# Patient Record
Sex: FEMALE | NOT HISPANIC OR LATINO | URBAN - METROPOLITAN AREA
[De-identification: names, ages, dates, MRNs, and addresses within clinical notes are randomized per-mention and may not be internally consistent; named-entity substitution may affect disease eponyms.]

---

## 2017-03-24 ENCOUNTER — IMPORTED ENCOUNTER (OUTPATIENT)
Dept: URBAN - METROPOLITAN AREA CLINIC 1 | Facility: CLINIC | Age: 52
End: 2017-03-24

## 2017-03-24 PROBLEM — H40.1133: Noted: 2017-03-24

## 2017-03-24 PROBLEM — H25.813: Noted: 2017-03-24

## 2017-03-24 PROCEDURE — 92014 COMPRE OPH EXAM EST PT 1/>: CPT

## 2017-03-24 PROCEDURE — 92133 CPTRZD OPH DX IMG PST SGM ON: CPT

## 2017-03-24 NOTE — PATIENT DISCUSSION
1.  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 Slight progression by OCT OU w/ prior non compliance. Patient to continue with Combigan OU TID and Lumigan OU QHS. ***Consider adding a drop verse ALT w/ any future progression or increased IOP OU.*** Pt advised to be compliant with gtts. Discussed likelihood of blindness in future pt understood. 2.  Cataract OU: Observe for now without intervention. The patient was advised to contact us if any change or worsening of vision3. Return for an appointment for a 10/HVF in 4 months with Dr. Ioana Montenegro.

## 2017-03-30 ENCOUNTER — IMPORTED ENCOUNTER (OUTPATIENT)
Dept: URBAN - METROPOLITAN AREA CLINIC 1 | Facility: CLINIC | Age: 52
End: 2017-03-30

## 2017-03-30 PROBLEM — H52.13: Noted: 2017-03-30

## 2017-03-30 PROBLEM — H52.4: Noted: 2017-03-30

## 2017-03-30 PROCEDURE — S0621 ROUTINE OPHTHALMOLOGICAL EXA: HCPCS

## 2017-03-30 NOTE — PATIENT DISCUSSION
1. Myopia: Rx was given for correction if indicated and requested. 2. Presbyopia 3. Open Angle Glaucoma- End stage OU (0.95 OU) - improved IOP OU today 4. Cataract OU - Observe 5. Helyn Speaker for an appointment in 1 year 36 with Dr. Ted Johansen.

## 2017-08-25 ENCOUNTER — IMPORTED ENCOUNTER (OUTPATIENT)
Dept: URBAN - METROPOLITAN AREA CLINIC 1 | Facility: CLINIC | Age: 52
End: 2017-08-25

## 2017-08-25 PROBLEM — H25.813: Noted: 2017-08-25

## 2017-08-25 PROBLEM — H16.143: Noted: 2017-08-25

## 2017-08-25 PROBLEM — H04.123: Noted: 2017-08-25

## 2017-08-25 PROBLEM — H40.1133: Noted: 2017-08-25

## 2017-08-25 PROCEDURE — 92012 INTRM OPH EXAM EST PATIENT: CPT

## 2017-08-25 PROCEDURE — 92083 EXTENDED VISUAL FIELD XM: CPT

## 2017-08-25 PROCEDURE — 83861 MICROFLUID ANALY TEARS: CPT

## 2017-08-25 NOTE — PATIENT DISCUSSION
1.  Open Angle Glaucoma End stage OU (0.95 OU)- Stable IOP OU. HVF defects show no progression OU. Patient to continue with Combigan OU TID and Latanoprost OU QHS. Patient advised to be compliant with gtts. Condition was discussed with patient and patient understands. Will continue to monitor patient for any progression in condition. Patient was advised to call us with any problems questions or concerns. 2.  JEFF w/ PEK OU- Moderate per tear lab OU. Increased symptoms. The use/continuation of artificial tears were recommended. Placed small Silicone Plugs RLL and LLL:  Risks benefits and alternatives to placing plug was discussed with patient. Patient understands and would like to proceed. Consent was signed. Post op instructions were discussed/given to patient and patient was advised to call our office if any problems arose. 3.  Cataract OU: Observe for now without intervention. The patient was advised to contact us if any change or worsening of vision4. Return for an appointment for a 30/OCT/glare in Feb. with Dr. Christin King.

## 2018-02-22 ENCOUNTER — IMPORTED ENCOUNTER (OUTPATIENT)
Dept: URBAN - METROPOLITAN AREA CLINIC 1 | Facility: CLINIC | Age: 53
End: 2018-02-22

## 2018-02-22 PROBLEM — H40.1133: Noted: 2018-02-22

## 2018-02-22 PROBLEM — H25.813: Noted: 2018-02-22

## 2018-02-22 PROBLEM — H04.123: Noted: 2018-02-22

## 2018-02-22 PROBLEM — H16.143: Noted: 2018-02-22

## 2018-02-22 PROCEDURE — 92133 CPTRZD OPH DX IMG PST SGM ON: CPT

## 2018-02-22 PROCEDURE — 92014 COMPRE OPH EXAM EST PT 1/>: CPT

## 2018-02-22 NOTE — PATIENT DISCUSSION
1.  Open Angle Glaucoma End stage OU (0.95 OU)- Stable IOP OU. OCT shows no progression OU. Patient to continue with Combigan OU TID and Latanoprost OU QHS. Add Azopt TID with any future progression. Patient advised to be compliant with gtts. 2.  JEFF w/ PEK OU- (H/o Plugs LLs OU) Cont ATs BID OU Routinely. 3.  Cataract OU: Observe for now without intervention. The patient was advised to contact us if any change or worsening of visionReturn for an appointment in August 10 VF 24-2 OU with Dr. Cristofer De Guzman.

## 2018-04-30 ENCOUNTER — IMPORTED ENCOUNTER (OUTPATIENT)
Dept: URBAN - METROPOLITAN AREA CLINIC 1 | Facility: CLINIC | Age: 53
End: 2018-04-30

## 2018-04-30 PROBLEM — H52.13: Noted: 2018-04-30

## 2018-04-30 PROBLEM — H52.4: Noted: 2018-04-30

## 2018-04-30 PROCEDURE — S0621 ROUTINE OPHTHALMOLOGICAL EXA: HCPCS

## 2018-04-30 NOTE — PATIENT DISCUSSION
1. Myopia: Rx was given for correction if indicated and requested. 2. Presbyopia3. Open Angle Glaucoma End stage OU (0.95 OU)- Stable IOP OU. Patient to continue with Combigan OU TID and Latanoprost OU QHS. 4.  JEFF w/ PEK OU- (H/o Plugs LLs OU) Cont ATs BID OU Routinely. 5.  Cataract OUReturn for an appointment in 1 year 36 with Dr. Shanda Arias.

## 2018-10-02 ENCOUNTER — IMPORTED ENCOUNTER (OUTPATIENT)
Dept: URBAN - METROPOLITAN AREA CLINIC 1 | Facility: CLINIC | Age: 53
End: 2018-10-02

## 2018-10-02 PROBLEM — H40.1133: Noted: 2018-10-02

## 2018-10-02 PROBLEM — H25.811: Noted: 2018-10-02

## 2018-10-02 PROBLEM — H16.143: Noted: 2018-10-02

## 2018-10-02 PROBLEM — H25.812: Noted: 2018-10-02

## 2018-10-02 PROBLEM — H25.813: Noted: 2018-10-02

## 2018-10-02 PROBLEM — H04.123: Noted: 2018-10-02

## 2018-10-02 PROCEDURE — 92015 DETERMINE REFRACTIVE STATE: CPT

## 2018-10-02 PROCEDURE — 92012 INTRM OPH EXAM EST PATIENT: CPT

## 2018-10-02 PROCEDURE — 92083 EXTENDED VISUAL FIELD XM: CPT

## 2018-10-02 NOTE — PATIENT DISCUSSION
1.  Cataract OS: Visually Significant discussed the risks benefits alternatives and limitations of cataract surgery. The patient stated a full understanding and a desire to proceed with the procedure. The patient will need to return for preop appointment with cataract measurements and to have any additional questions answered and start pre-operative eye drops as directed. *Discussed risks of Cataract sx given Advanced stage of COAG pt understands. Schedule phaco PCL OSOtherwise follow-up in 4-6 weeks for an IOP check on new drop regiment2. Cataract OD: Observe for now without intervention. The patient was advised to contact us if any change or worsening of vision3. Severe Open Angle Glaucoma OS>OD (0.95 OU)- HVF shows progression OU. Stable IOP and C/D OU. Start monotrial o  Rhopressa OD QHS (sample given.) Start monotrial of Azopt OS TID. Patient to switch Lumigan OU QHS. Continue Combigan OU TID. If response poor to drop change and phaco/PCL pt will need to consider Trab. Patient advised to be compliant with gtts. Condition was discussed with patient and patient understands. Will continue to monitor patient for any progression in condition. Patient was advised to call us with any problems questions or concerns. 4.  JEFF w/ PEK OS>OD- Plugs intact LLs OU. The continuation of artificial tears were recommended. 5. Return for an appointment for Ascestuardo H&P OS with Dr. Jahaira Jerry.

## 2018-10-15 ENCOUNTER — IMPORTED ENCOUNTER (OUTPATIENT)
Dept: URBAN - METROPOLITAN AREA CLINIC 1 | Facility: CLINIC | Age: 53
End: 2018-10-15

## 2018-10-15 PROBLEM — H04.123: Noted: 2018-10-15

## 2018-10-15 PROBLEM — H40.1133: Noted: 2018-10-15

## 2018-10-15 PROBLEM — H16.143: Noted: 2018-10-15

## 2018-10-15 PROCEDURE — 99213 OFFICE O/P EST LOW 20 MIN: CPT

## 2018-10-15 NOTE — PATIENT DISCUSSION
1.  End Stage Open Angle Glaucoma OU (CD 0.95 OU) - Good response to new gtt regiment. Will now use Rhopressa QHS OU and Azopt TID OU decrease Combigan BID OU (due to Rhopressa being used OU now) and Lumigan QHS OU. Patient advised to be compliant with gtts. Written rx given for Combigan Rhopressa and Azopt 2. JEFF w/ PEK OU- Plugs intact LLs OU. The use/continuation of artificial tears were recommended. 3.  Cataract OS - RTC as scheduled for Phaco/PCL OS 4. Cataract OD - ObserveReturn for an appointment for Return as scheduled with Dr. Flo Storm.

## 2018-11-05 ENCOUNTER — IMPORTED ENCOUNTER (OUTPATIENT)
Dept: URBAN - METROPOLITAN AREA CLINIC 1 | Facility: CLINIC | Age: 53
End: 2018-11-05

## 2018-11-05 PROBLEM — H25.812: Noted: 2018-11-05

## 2018-11-05 PROCEDURE — 92136 OPHTHALMIC BIOMETRY: CPT

## 2018-11-05 NOTE — PATIENT DISCUSSION
1. Cataract OS:  Visually Significant discussed the risks benefits alternatives and limitations of cataract surgery. The patient stated a full understanding and a desire to proceed with the procedure. Phaco PCL OS (Standard lens standard procedure)  **Myopic Goal** Return for an appointment for Return as scheduled with Dr. Lele Hartley.

## 2019-01-15 ENCOUNTER — IMPORTED ENCOUNTER (OUTPATIENT)
Dept: URBAN - METROPOLITAN AREA CLINIC 1 | Facility: CLINIC | Age: 54
End: 2019-01-15

## 2019-01-15 PROBLEM — H04.123: Noted: 2019-01-15

## 2019-01-15 PROBLEM — H40.1133: Noted: 2019-01-15

## 2019-01-15 PROBLEM — H16.143: Noted: 2019-01-15

## 2019-01-15 PROCEDURE — 99213 OFFICE O/P EST LOW 20 MIN: CPT

## 2019-01-15 NOTE — PATIENT DISCUSSION
1.  End Stage Open Angle Glaucoma OU (CD 0.95 OU) - increased IOP OU. Recently had SLT OD with VEC. Will defer drop  regimen to Dr. Tiffanie Queen. ? should PT consider filtering procedure. She will discuss with Dr. Tiffanie Queen. Rhopressa QHS OU Azopt TID OU Combigan BID OU and Lumigan QHS OU. Patient advised to be compliant with gtts. 2.  JEFF w/ PEK OU - Plugs intact LLs OU. The use/continuation of artificial tears were recommended. 3.  Cataract OS - Visually significant. PT would like to hold on surgery at this time. Explained to patient that cataract surgery is recommended. 4. Cataract OD - ObserveReturn for an appointment in as needed with Dr. Justina Encinas.

## 2020-07-10 ENCOUNTER — IMPORTED ENCOUNTER (OUTPATIENT)
Dept: URBAN - METROPOLITAN AREA CLINIC 1 | Facility: CLINIC | Age: 55
End: 2020-07-10

## 2020-07-10 PROBLEM — H25.813: Noted: 2020-07-10

## 2020-07-10 PROBLEM — H40.1133: Noted: 2020-07-10

## 2020-07-10 PROBLEM — H47.233: Noted: 2020-07-10

## 2020-07-10 PROCEDURE — 92133 CPTRZD OPH DX IMG PST SGM ON: CPT

## 2020-07-10 PROCEDURE — 92014 COMPRE OPH EXAM EST PT 1/>: CPT

## 2020-07-10 NOTE — PATIENT DISCUSSION
1.  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 H/o SLT OD with VEC. Will defer drop  regimen to Dr. Benson Fan. Cont Rhopressa QHS OU Azopt TID OU Combigan BID OU and Lumigan QHS OU. Patient advised to be compliant with gtts. ** PT to consider filtering procedure vs Phaco. Patient will discuss with Dr. Benson Fan. 2. Cataract OU -- Visually significant OS secondary to glare OD. Highly recommend phaco at this time. Patient to follow up with Benson Fan for discussion of further surgical option. Patient may call to schedule Phaco if desires Dr. Ricardo Miranda to perform Phaco. 3.  JEFF w/ PEK OU - Plugs intact LLs OU. The use/continuation of artificial tears were recommended. Return for an appointment in as needed with Dr. Jahaira Jerry.

## 2021-08-23 ENCOUNTER — IMPORTED ENCOUNTER (OUTPATIENT)
Dept: URBAN - METROPOLITAN AREA CLINIC 1 | Facility: CLINIC | Age: 56
End: 2021-08-23

## 2021-08-23 PROBLEM — H52.4: Noted: 2021-08-23

## 2021-08-23 PROBLEM — Z96.1: Noted: 2021-08-23

## 2021-08-23 PROBLEM — H25.811: Noted: 2021-08-23

## 2021-08-23 PROBLEM — H40.1133: Noted: 2021-08-23

## 2021-08-23 PROCEDURE — S0621 ROUTINE OPHTHALMOLOGICAL EXA: HCPCS

## 2021-08-23 NOTE — PATIENT DISCUSSION
1.  Presbyopia: Rx was given for corrective spectacles if indicated. 2.  End Stage Open Angle Glaucoma OU -(0.95 C/D OU)Followed by Dr. Tory Johnson at [facility]. Patient advised to be compliant with gtts. Condition was discussed with patient and patient understands. 3.  Cataract OD: Observe for now without intervention. The patient was advised to contact us if any change or worsening of vision4. Pseudophakia OS (Dr Merle Jin. Return for an appointment in 1 year for 40 with Dr. Mel Valerio.

## 2021-08-23 NOTE — PATIENT DISCUSSION
Severe Open Angle Glaucoma OU -Followed by Dr. Juan A Moss at [facility]. Patient advised to be compliant with gtts. Condition was discussed with patient and patient understands.

## 2022-03-19 PROBLEM — M17.12 OSTEOARTHRITIS OF LEFT KNEE: Status: ACTIVE | Noted: 2019-10-23

## 2022-04-08 ASSESSMENT — VISUAL ACUITY
OD_SC: 20/20
OD_SC: 20/20
OS_CC: 20/250
OS_SC: 20/50
OS_SC: 20/30-1
OS_SC: 20/60-1
OD_SC: 20/25
OD_GLARE: 20/60
OS_GLARE: 20/50
OD_SC: 20/20
OS_SC: 20/200
OS_SC: 20/20
OD_SC: 20/20
OS_SC: J1
OD_CC: 20/70
OS_SC: 20/20
OS_SC: J10
OS_SC: 20/20
OD_SC: 20/20
OD_GLARE: 20/30
OD_SC: J1
OS_SC: 20/40
OD_SC: J1+

## 2022-04-08 ASSESSMENT — TONOMETRY
OD_IOP_MMHG: 14
OS_IOP_MMHG: 12
OD_IOP_MMHG: 16
OS_IOP_MMHG: 18
OD_IOP_MMHG: 18
OS_IOP_MMHG: 15
OS_IOP_MMHG: 16
OD_IOP_MMHG: 18
OD_IOP_MMHG: 17
OS_IOP_MMHG: 18
OD_IOP_MMHG: 14
OS_IOP_MMHG: 17
OD_IOP_MMHG: 18
OD_IOP_MMHG: 18
OS_IOP_MMHG: 19
OS_IOP_MMHG: 17
OD_IOP_MMHG: 17
OS_IOP_MMHG: 17
OD_IOP_MMHG: 16
OD_IOP_MMHG: 16
OS_IOP_MMHG: 18
OS_IOP_MMHG: 17

## 2023-01-31 RX ORDER — FEXOFENADINE HCL AND PSEUDOEPHEDRINE HCI 60; 120 MG/1; MG/1
1 TABLET, EXTENDED RELEASE ORAL EVERY 12 HOURS
COMMUNITY

## 2023-01-31 RX ORDER — ACETAMINOPHEN 500 MG
1000 TABLET ORAL EVERY 6 HOURS
COMMUNITY
Start: 2019-10-25

## 2023-01-31 RX ORDER — ASPIRIN 81 MG/1
81 TABLET ORAL 2 TIMES DAILY
COMMUNITY
Start: 2019-10-25

## 2023-01-31 RX ORDER — PANTOPRAZOLE SODIUM 40 MG/1
40 TABLET, DELAYED RELEASE ORAL DAILY
COMMUNITY
Start: 2019-10-25

## 2024-11-22 NOTE — PATIENT DISCUSSION
1.  Cataract OU: Observe for now without intervention. The patient was advised to contact us if any change or worsening of vision2. Severe Open Angle Glaucoma OS>OD (0.95 OU)- Stable IOP and C/D OU. Patient to continue with current gtt regimen. Patient advised to be compliant with gtts. Condition was discussed with patient and patient understands. Will continue to monitor patient for any progression in condition. Patient was advised to call us with any problems questions or concerns. 3.  JEFF w/ PEK OU-The use/continuation of artificial tears were recommended. LVM WITH DATE AND TIME. IF UNABLE TO KEEP APPT PLEASE CONTACT OUR OFFICE